# Patient Record
Sex: MALE | Race: WHITE
[De-identification: names, ages, dates, MRNs, and addresses within clinical notes are randomized per-mention and may not be internally consistent; named-entity substitution may affect disease eponyms.]

---

## 2020-06-13 ENCOUNTER — HOSPITAL ENCOUNTER (EMERGENCY)
Dept: HOSPITAL 41 - JD.ED | Age: 35
Discharge: HOME | End: 2020-06-13
Payer: COMMERCIAL

## 2020-06-13 VITALS — HEART RATE: 74 BPM | SYSTOLIC BLOOD PRESSURE: 128 MMHG | DIASTOLIC BLOOD PRESSURE: 85 MMHG

## 2020-06-13 DIAGNOSIS — T15.02XA: Primary | ICD-10-CM

## 2020-06-13 DIAGNOSIS — Z23: ICD-10-CM

## 2020-06-13 PROCEDURE — 65222 REMOVE FOREIGN BODY FROM EYE: CPT

## 2020-06-13 PROCEDURE — 90471 IMMUNIZATION ADMIN: CPT

## 2020-06-13 PROCEDURE — 99283 EMERGENCY DEPT VISIT LOW MDM: CPT

## 2020-06-13 PROCEDURE — 90715 TDAP VACCINE 7 YRS/> IM: CPT

## 2020-06-13 NOTE — EDM.PDOC
ED HPI GENERAL MEDICAL PROBLEM





- General


Chief Complaint: Eye Problems


Stated Complaint: FB LEFT EYE


Time Seen by Provider: 06/13/20 10:34





- History of Present Illness


INITIAL COMMENTS - FREE TEXT/NARRATIVE: 





34-year-old male since the emergency room with foreign body sensation in his 

left eye.





This started yesterday afternoon about the time he finished work.  He does work 

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  He has moderate discomfort associated with this 

and feels like it could be in his upper eyelid.  Patient has had several 

episodes where he has had foreign bodies in his eyes.  Patient is unclear of 

his last tetanus shot.  Patient denies any other complaints at this time


  ** Left Eye


Pain Score (Numeric/FACES): 8





- Related Data


 Allergies











Allergy/AdvReac Type Severity Reaction Status Date / Time


 


No Known Allergies Allergy   Verified 06/13/20 09:46











Home Meds: 


 Home Meds





. [No Known Home Meds]  12/24/15 [History]











Past Medical History





- Past Health History


Medical/Surgical History: Denies Medical/Surgical History


HEENT History: Reports: Impaired Vision


Other HEENT History: wears eyeglasses.





- Infectious Disease History


Infectious Disease History: Reports: Chicken Pox





- Past Surgical History


HEENT Surgical History: Reports: Tonsillectomy





Social & Family History





- Tobacco Use


Smoking Status *Q: Never Smoker


Second Hand Smoke Exposure: No





- Caffeine Use


Caffeine Use: Reports: Soda





- Recreational Drug Use


Recreational Drug Use: No





ED ROS GENERAL





- Review of Systems


Review Of Systems: See Below


Constitutional: Reports: No Symptoms


HEENT: Reports: Eye Pain


Respiratory: Reports: No Symptoms


Cardiovascular: Reports: No Symptoms


GI/Abdominal: Reports: No Symptoms





ED EXAM GENERAL W FULL EYE





- Physical Exam


Exam: See Below


Exam Limited By: No Limitations


General Appearance: Alert, No Apparent Distress


Eye Exam: Left Eye: Conjunctival Injection (Mild), Corneal Abrasion (Seen with 

slit-lamp after foreign body removed), Bilateral Eye: PERRL


Visual Acuity (R) 20/: 20


Visual Acuity (L) 20/: 25


With Correction: Yes


Eyelids: Left: Lid Everted for Exam, Bilateral: Normal Appearance, Foreign Body


Conjunctiva & Sclera: Right: Normal Appearance, Left: Injected (Held)


Cornea Exam: Left: Corneal Abrasion, Examined with Flourescein


Extraocular Movements: Bilateral: Intact


Pupils: Normal Accommodation


Anterior Chamber: Bilateral: Normal Appearance


Posterior Chamber: Left: Normal Funduscopic


Head: Atraumatic, Normocephalic


Neck: Normal Inspection, Supple, Non-Tender, Full Range of Motion


Respiratory/Chest: No Respiratory Distress, Lungs Clear, Normal Breath Sounds


Cardiovascular: Regular Rate, Rhythm, No Edema, No Murmur





ED EYE w/ Add Procedure





- Eye Procedure


Alcaine Drops Administered: No (Packing was removed)


Eye FB Removal: Other (Ice but)


Antibiotic Oinment/Drps Admin: Left Eye


Progress: 





After the proparacaine wore off the patient stated he was doing much better.  

He started on erythromycin ointment to use the rest of today.





- Additional/Other Procedure(s)


Other (Free Text) Procedure(s) [Text1]: 





Using the slit-lamp initially foreign body was identified on the medial aspect 

of the left cornea.  This was removed easily using an eye spud.  After this 

with fluorescein and cobalt blue light 3 small abrasions that were under the 

foreign body were identified.





Course





- Vital Signs


Last Recorded V/S: 





 Last Vital Signs











Temp  36.4 C   06/13/20 09:40


 


Pulse  86   06/13/20 09:40


 


Resp  16   06/13/20 09:40


 


BP  141/76 H  06/13/20 09:40


 


Pulse Ox  97   06/13/20 09:40














Departure





- Departure


Time of Disposition: 12:15


Disposition: Home, Self-Care 01


Clinical Impression: 


 Corneal foreign body, Corneal abrasion, left








- Discharge Information


Referrals: 


PCP,None [Primary Care Provider] - 


Additional Instructions: 


Return to the emergency room with any questions problems or worsening symptoms.





Use the erythromycin ointment he received in the emergency room 1/3 to 1/2 inch 

behind the lower eyelid every couple hours while awake.  You should continue 

this throughout the day today and tomorrow if you are still having symptoms.





Follow-up with your eye doctor on Monday for recheck.





Sepsis Event Note (ED)





- Evaluation


Sepsis Screening Result: No Definite Risk





- Focused Exam


Vital Signs: 





 Vital Signs











  Temp Pulse Resp BP Pulse Ox


 


 06/13/20 09:40  36.4 C  86  16  141/76 H  97

## 2022-10-28 ENCOUNTER — HOSPITAL ENCOUNTER (EMERGENCY)
Dept: HOSPITAL 41 - JD.ED | Age: 37
Discharge: HOME | End: 2022-10-28
Payer: COMMERCIAL

## 2022-10-28 DIAGNOSIS — R51.9: Primary | ICD-10-CM

## 2022-10-28 PROCEDURE — 99283 EMERGENCY DEPT VISIT LOW MDM: CPT

## 2023-02-02 ENCOUNTER — HOSPITAL ENCOUNTER (EMERGENCY)
Dept: HOSPITAL 41 - JD.ED | Age: 38
Discharge: HOME | End: 2023-02-02
Payer: COMMERCIAL

## 2023-02-02 DIAGNOSIS — K08.89: Primary | ICD-10-CM

## 2023-02-02 PROCEDURE — 99282 EMERGENCY DEPT VISIT SF MDM: CPT

## 2023-10-21 ENCOUNTER — HOSPITAL ENCOUNTER (EMERGENCY)
Dept: HOSPITAL 41 - JD.ED | Age: 38
Discharge: HOME | End: 2023-10-21
Payer: COMMERCIAL

## 2023-10-21 DIAGNOSIS — R20.0: Primary | ICD-10-CM

## 2023-10-21 DIAGNOSIS — Z79.899: ICD-10-CM

## 2023-10-21 LAB
ALBUMIN SERPL-MCNC: 4.3 G/DL (ref 3.4–5)
ALBUMIN/GLOB SERPL: 1.3 {RATIO} (ref 1–2)
ALP SERPL-CCNC: 84 U/L (ref 46–116)
ALT SERPL-CCNC: 29 U/L (ref 16–63)
ANION GAP SERPL CALC-SCNC: 15 MMOL/L (ref 5–15)
AST SERPL-CCNC: 19 U/L (ref 15–37)
BASOPHILS # BLD AUTO: 0.1 K/MM3 (ref 0–0.2)
BASOPHILS NFR BLD AUTO: 1.5 % (ref 0–1)
BILIRUB SERPL-MCNC: 0.4 MG/DL (ref 0.2–1)
BUN SERPL-MCNC: 20 MG/DL (ref 7–18)
BUN/CREAT SERPL: 18.2 (ref 14–18)
CALCIUM SERPL-MCNC: 9.4 MG/DL (ref 8.5–10.1)
CHLORIDE SERPL-SCNC: 102 MEQ/L (ref 98–107)
CO2 SERPL-SCNC: 26 MEQ/L (ref 21–32)
CREAT CL 24H UR+SERPL-VRATE: 91.05 ML/MIN
CREAT SERPL-MCNC: 1.1 MG/DL (ref 0.7–1.3)
EGFRCR SERPLBLD CKD-EPI 2021: 88 ML/MIN (ref 60–?)
EOSINOPHIL # BLD AUTO: 0 K/MM3 (ref 0–0.4)
EOSINOPHIL NFR BLD AUTO: 0.5 % (ref 0–6)
GLOBULIN SER-MCNC: 3.3 GM/DL
GLUCOSE SERPL-MCNC: 112 MG/DL (ref 70–99)
HCT VFR BLD AUTO: 44 % (ref 42–52)
HGB BLD-MCNC: 15 GM/DL (ref 14–18)
IMM GRANULOCYTES # BLD: 0.02 K/MM3 (ref 0–0.05)
IMM GRANULOCYTES NFR BLD: 0.3 % (ref 0–0.4)
LYMPHOCYTES # BLD AUTO: 1.6 K/MM3 (ref 1–4.8)
LYMPHOCYTES NFR BLD AUTO: 23.7 % (ref 24–44)
MAGNESIUM SERPL-MCNC: 1.9 MG/DL (ref 1.8–2.4)
MCH RBC QN AUTO: 29.9 PG (ref 28–32)
MCHC RBC AUTO-ENTMCNC: 34.1 G/DL (ref 32–36)
MCHC RBC AUTO-ENTMCNC: 87.8 FL (ref 83–99)
MONOCYTES # BLD AUTO: 0.4 K/MM3 (ref 0–0.8)
MONOCYTES NFR BLD AUTO: 6.3 % (ref 0–8)
NEUTROPHILS # BLD AUTO: 4.4 K/MM3 (ref 1.8–7.7)
NEUTROPHILS NFR BLD AUTO: 67.7 % (ref 41–71)
NRBC BLD AUTO-RTO: 0 % (ref 0–0.02)
NRBC BLD AUTO-RTO: 0 % (ref 0–0.2)
PLATELET # BLD AUTO: 239 K/MM3 (ref 150–400)
PMV BLD AUTO: 10.5 FL (ref 9.4–12.4)
POTASSIUM SERPL-SCNC: 4 MEQ/L (ref 3.5–5.1)
PROT SERPL-MCNC: 7.6 G/DL (ref 6.4–8.2)
RBC # BLD AUTO: 5.01 M/MM3 (ref 4.52–5.9)
SODIUM SERPL-SCNC: 139 MEQ/L (ref 136–145)
WBC # BLD AUTO: 6.53 K/MM3 (ref 3.9–11.3)

## 2023-12-06 ENCOUNTER — HOSPITAL ENCOUNTER (EMERGENCY)
Dept: HOSPITAL 41 - JD.ED | Age: 38
Discharge: HOME | End: 2023-12-06
Payer: COMMERCIAL

## 2023-12-06 DIAGNOSIS — R07.89: Primary | ICD-10-CM

## 2023-12-06 DIAGNOSIS — K21.00: ICD-10-CM

## 2023-12-06 LAB
ALBUMIN SERPL-MCNC: 4.7 G/DL (ref 3.4–5)
ALBUMIN/GLOB SERPL: 1.4 {RATIO} (ref 1–2)
ALP SERPL-CCNC: 72 U/L (ref 46–116)
ALT SERPL-CCNC: 21 U/L (ref 16–63)
ANION GAP SERPL CALC-SCNC: 16.8 MMOL/L (ref 5–15)
AST SERPL-CCNC: 15 U/L (ref 15–37)
BASOPHILS # BLD AUTO: 0.1 K/MM3 (ref 0–0.2)
BASOPHILS NFR BLD AUTO: 1.4 % (ref 0–1)
BILIRUB SERPL-MCNC: 0.7 MG/DL (ref 0.2–1)
BUN SERPL-MCNC: 12 MG/DL (ref 7–18)
BUN/CREAT SERPL: 10.9 (ref 14–18)
CALCIUM SERPL-MCNC: 9.8 MG/DL (ref 8.5–10.1)
CHLORIDE SERPL-SCNC: 103 MEQ/L (ref 98–107)
CO2 SERPL-SCNC: 25 MEQ/L (ref 21–32)
CREAT CL 24H UR+SERPL-VRATE: 88.09 ML/MIN
CREAT SERPL-MCNC: 1.1 MG/DL (ref 0.7–1.3)
EGFRCR SERPLBLD CKD-EPI 2021: 88 ML/MIN (ref 60–?)
EOSINOPHIL # BLD AUTO: 0 K/MM3 (ref 0–0.4)
EOSINOPHIL NFR BLD AUTO: 0.3 % (ref 0–6)
GLOBULIN SER-MCNC: 3.3 GM/DL
GLUCOSE SERPL-MCNC: 94 MG/DL (ref 70–99)
HCT VFR BLD AUTO: 44.1 % (ref 42–52)
HGB BLD-MCNC: 15.1 GM/DL (ref 14–18)
IMM GRANULOCYTES # BLD: 0.02 K/MM3 (ref 0–0.05)
IMM GRANULOCYTES NFR BLD: 0.3 % (ref 0–0.4)
LYMPHOCYTES # BLD AUTO: 2.1 K/MM3 (ref 1–4.8)
LYMPHOCYTES NFR BLD AUTO: 29.6 % (ref 24–44)
MAGNESIUM SERPL-MCNC: 2.4 MG/DL (ref 1.8–2.4)
MCH RBC QN AUTO: 29 PG (ref 28–32)
MCHC RBC AUTO-ENTMCNC: 34.2 G/DL (ref 32–36)
MCHC RBC AUTO-ENTMCNC: 84.8 FL (ref 83–99)
MONOCYTES # BLD AUTO: 0.5 K/MM3 (ref 0–0.8)
MONOCYTES NFR BLD AUTO: 7 % (ref 0–8)
NEUTROPHILS # BLD AUTO: 4.3 K/MM3 (ref 1.8–7.7)
NEUTROPHILS NFR BLD AUTO: 61.4 % (ref 41–71)
NRBC BLD AUTO-RTO: 0 % (ref 0–0.02)
NRBC BLD AUTO-RTO: 0 % (ref 0–0.2)
PLATELET # BLD AUTO: 281 K/MM3 (ref 150–400)
PMV BLD AUTO: 10.4 FL (ref 9.4–12.4)
POTASSIUM SERPL-SCNC: 3.8 MEQ/L (ref 3.5–5.1)
PROT SERPL-MCNC: 8 G/DL (ref 6.4–8.2)
RBC # BLD AUTO: 5.2 M/MM3 (ref 4.52–5.9)
SODIUM SERPL-SCNC: 141 MEQ/L (ref 136–145)
TROPONIN I SERPL HS-IMP: 7 PG/ML (ref ?–76)
WBC # BLD AUTO: 7.02 K/MM3 (ref 3.9–11.3)

## 2023-12-06 PROCEDURE — 83735 ASSAY OF MAGNESIUM: CPT

## 2023-12-06 PROCEDURE — 71046 X-RAY EXAM CHEST 2 VIEWS: CPT

## 2023-12-06 PROCEDURE — 84484 ASSAY OF TROPONIN QUANT: CPT

## 2023-12-06 PROCEDURE — 99285 EMERGENCY DEPT VISIT HI MDM: CPT

## 2023-12-06 PROCEDURE — 85025 COMPLETE CBC W/AUTO DIFF WBC: CPT

## 2023-12-06 PROCEDURE — 93005 ELECTROCARDIOGRAM TRACING: CPT

## 2023-12-06 PROCEDURE — 80053 COMPREHEN METABOLIC PANEL: CPT

## 2023-12-06 PROCEDURE — 36415 COLL VENOUS BLD VENIPUNCTURE: CPT

## 2023-12-13 ENCOUNTER — HOSPITAL ENCOUNTER (OUTPATIENT)
Dept: HOSPITAL 41 - JD.SDS | Age: 38
Discharge: HOME | End: 2023-12-13
Attending: SURGERY
Payer: COMMERCIAL

## 2023-12-13 DIAGNOSIS — K29.80: ICD-10-CM

## 2023-12-13 DIAGNOSIS — K20.90: Primary | ICD-10-CM

## 2023-12-13 DIAGNOSIS — K31.89: ICD-10-CM

## 2023-12-13 DIAGNOSIS — R63.4: ICD-10-CM

## 2023-12-13 DIAGNOSIS — K29.70: ICD-10-CM

## 2023-12-13 DIAGNOSIS — K22.89: ICD-10-CM

## 2023-12-13 DIAGNOSIS — K44.9: ICD-10-CM

## 2023-12-13 DIAGNOSIS — Z79.899: ICD-10-CM

## 2023-12-13 DIAGNOSIS — K52.9: ICD-10-CM

## 2023-12-13 DIAGNOSIS — I78.1: ICD-10-CM

## 2023-12-13 PROCEDURE — 43239 EGD BIOPSY SINGLE/MULTIPLE: CPT

## 2023-12-13 PROCEDURE — 45380 COLONOSCOPY AND BIOPSY: CPT
